# Patient Record
Sex: FEMALE | Race: WHITE
[De-identification: names, ages, dates, MRNs, and addresses within clinical notes are randomized per-mention and may not be internally consistent; named-entity substitution may affect disease eponyms.]

---

## 2022-05-02 ENCOUNTER — APPOINTMENT (OUTPATIENT)
Dept: RADIOLOGY | Facility: OTHER | Age: 22
End: 2022-05-02
Attending: FAMILY MEDICINE

## 2022-05-02 DIAGNOSIS — M79.644 PAIN OF RIGHT THUMB: ICD-10-CM

## 2022-05-02 PROCEDURE — 73140 X-RAY EXAM OF FINGER(S): CPT | Mod: TC,FY,RT | Performed by: RADIOLOGY

## 2023-10-24 ENCOUNTER — SPEECH THERAPY (OUTPATIENT)
Dept: SPEECH THERAPY | Facility: OTHER | Age: 23
End: 2023-10-24
Payer: COMMERCIAL

## 2023-10-24 DIAGNOSIS — R49.8 FUNCTIONAL VOICE DISORDER: ICD-10-CM

## 2023-10-24 PROCEDURE — 92520 LARYNGEAL FUNCTION STUDIES: CPT | Mod: GN,GC | Performed by: SPEECH-LANGUAGE PATHOLOGIST

## 2023-10-24 NOTE — OP THERAPY EVALUATION
Outpatient Speech Therapy  INITIAL EVALUATION    [facility] Speech Pathology & Audiology  [facility] [address]  Phone:  [phone]  Fax:  [phone]    Date of Evaluation: 10/24/2023    Patient: Lauren Palma  YOB: 2000  MRN: [id number]     Referring Provider: Jaren Rowley M.D.  [address] [place] [address]   Referring Diagnosis Vocal Cord Dysfunction     Time Calculation      Time In: 14:50 Time Out: 15:40  Time Calculation: 90 minutes           Chief Complaint: Vocal Cord Dysfunction    Subjective:   Reason for Therapy:     Reason For Evaluation:  Voice    Onset Date:  2023  Social Support:     Patient Mental Status:  Alert and Responsive  Progress Factors:     Progression:  Getting worse    Aggravating Factors:  Physical Activity  Pain:     Current pain ratin    At best pain ratin    At worst pain ratin  Therapy History:     Previous Diet:  Normal Diet and Normal Consistency    Current Diet:  Normal Consistency and Regular Diet    Hearing:  No Deficits    Vision:  Eye Glasses    Dentition:  Complete Dentition    Handedness:  Right-handed    No past medical history on file.  No past surgical history on file.  Speech Therapy Objective    Background Information:   Ms. Palma is a 23 year old female referred to the [facility] Speech Pathology and Audiology clinic by Jaren Rowley M.D. at the [facility]. She was referred for exercise-induced laryngeal obstruction. 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    Ms. Palma stated her chief complaint is “difficulty breathing-in' and a burning sensation while participating in high-intensity exercise.” She reports shortness of breath during these events. She states the shortness of breath is constant during the exercises but the problem escalated in severity 5-6 weeks ago. She describes  the “burning in the throat” as bothersome from her chest to her larynx. Along with having a burning sensation in her throat, she experiences a high-pitched wheezing noise that comes from her larynx every time she inhales. She denies having trouble breathing or experiencing a burning sensation in normal day-to-day activities.     Voice History & Evaluation Information Form (CPT 37214)  Form filled out by patient:    Briefly describe the problems you are having with your voice: Difficult and burning while breathing during high-intensity exercise.   What do you think caused these problems? I don't know  When did these problems begin? Probably a year ago. Started noticing 6 months ago  Was the onset sudden or gradual? Gradual  Are there times of the day when your voice is better or worse? No  How do you use your voice throughout the day? Normal. No singing, etc.  Do you talk above noise very often? No  Do you sing in a choir or for fun? No  Have you had previous treatment for voice problems? If you have, explain what it was. No  Do you ever have a burning sensation in your chest, a sour taste in your mouth or other symptoms of heartburn? If yes, how often do you feel these symptoms? A bit of burning on the chest during high-intensity exercise  Have you had a recent hearing evaluation? What were the findings? No  Do you wake up with a voice that is hoarse, rough, or low pitch? No   If yes, how often does this happen? __________________________________________        13. Do you wake up with a sour taste in your mouth? No        14. Do you wake up at night coughing? No            Do you now or have you ever had any of the following? Yes No   Bulimia  X   Arthritis  X   Stroke  X   Surgeries  X   Heart  X   Neck  X   Larynx  X   Thyroid Gland  X   Tonsils/Adenoids  X   Intubation  X   Frequent Colds  X   Shaking/tremors  X   Jaw Pain  X   Ear Pain  X   Tracheostomy  X   Tonsillitis  X       Do you now or have you ever had  "any of the following? Yes No   Allergies  X   PMS  X   Hormone Imbalance  X   Sleep apnea/snoring  X   Injury to neck  X   Neurologic disease  X   Acid indigestion  X   Trouble with lips or tongue  X   Chronic sinusitis  X   Trouble Walking  X   Dental Pain  X   Bronchitis  X   Enlarged glands  X   Ear Disease  X   Swallowing problems  X   Hypernasality  X   Paralysis/paresis  X   Enlarged glands  X   Ear Disease  X       SMOKING  Have you ever smoked?  No  How much do/did you smoke? N/A  Have you quit smoking? N/A    BEVERAGES  How much water do you drink daily? 3 Liters  How much coffee, tea, juice, and soda do you drink a day? 1 cup of coffee  How much alcohol do you drink in a week? N/A    MEDICATIONS  Do you have allergies to any medications? No  If so, what medications? Lamotrigine  What medications are you taking, the dosage, and reason for the medications? 25mg daily    Auditory Perceptual Ratings: CAPE-V conversational level  Overall Severity: functional 0/100  Roughness: functional 0/100  Breathiness: functional 0/100  Strain: functional 0/100  Pitch: functional 0/100  Loudness: functional 0/100    Assessment of Vocal Quality (Laryngeal Function (CPT 52032):  Visi-Pitch Multi Dimensional - MDVP: Acoustic Parameters  Parameters Normative Data Trial 1 Results Trial 2 Results   Fundamental Frequency Children: 265 Hz  Women: 225 Hz  Men: 128 Hz 186 Hz 211 Hz   RAP (frequency) .68-1.040% .805% .307%   Shimmer (amplitude) 3.81% 5.180% 2.032%   NHR .19 .156 .128     Visi-Pitch Real Time  Parameters Habitual Slide low Sustained low Slide High Sustained High   Mean Hz 190/194       ---           ---       ---           ---   Minimum Hz       --- 160           ---       ---           ---   Maximum Hz       ---       ---           --- 372           ---     Auditory Perceptual: Perceptibly \"patient\" vocal quality and pitch was normal with a decreased maximum phonation time (MPT) of 21.59 seconds for her age and sex.  "     Acoustic measures: Ms. Palma’s average fundamental frequency in connected speech was 186 Hz (low normal range). During two trials of phonation at modal pitch for 5.75 seconds. Patient produced a fundamental frequency of 186 Hz, with a pertubation of frequency RAP of .805% (functional), and a pertubation of amplitude of  5.180% (slightly high). The second trial produced similar numbers. When she was asked to sustained a hum, patient pitch increased to a normal range of 211 Hz (normal) and her with pertubation of frequency RAP .307% and pertubation of amplitude shimmer 2.032%.      Phonatory Aerodynamic System  Comfortable                       Maximum Phonation    Parameters     Results          Norms               Parameters       Results Norms    Mean SPL  65 dB SPL        65-80 dB SPL   Mean SPL  65 dB SPL 65-80 dB SPL    Mean Pitch  144 Hz Males: 135 dB SPL  Females: 225 dB SPL  Children: 265 dB SPL   Mean Pitch  186 Hz Males: 135 dB SPL  Females: 225 dB SPL  Children: 265 dB SPL    Phonation Time  5.75 secs    Phonation Time  21.59 secs Males: 25 seconds    Females: 21 seconds    Children: 7-9 seconds    Mean Expiratory Airflow  130 ml/sec      Abnormal      <40ml/sec   >200 ml/sec     Mean Expiratory Airflow  120 ml/sec Abnormal     <40ml/sec   >200 ml/sec    Expiratory Volume  .73 L Males: 3.25 L (SD .602)    Females: 2.14 L (SD .345)    Children: .700-1.650 L   Expiratory Volume  2.64 L Males: 3.25 L (SD .602)    Females: 2.14 L (SD .345)    Children: .700-1.650 L     Aerodynamic Assessment: revealed a mean airflow rate of 130 ml/second (functional) at a comfortable pitch and loudness with a maximum phonation time (MPT) of 5.75 seconds. A mean airflow rate of 120 ml/second (functional) at maximum phonation with MPT of 21.59 seconds. Maximum phonation expiratory volume was 2.64L.(functional)    Endoscopy with Stroboscopy (.39)    Videostroboscopy Rating   Glottal Closure: Posterior chink    Supraglottic Activity     Latero-Medial Compression: None     Merlin-Post Compression: Moderate - 3/5   Vertical Level Approximation: Equal - 1/4   Vocal Fold Edge: clear margins     Left: Smooth-Straight - 1/6     Right: Smooth-Straight - 1/6   Vocal Fold Mobility     Left: Normal - 1/4     Right: Normal - 1/4     Comments: Aperiodic vibration of the vocal folds   Amplitude of Vibration: Diminished     Left: Restricted to less than 50% - 2/7   Right: Restricted to less than 50% - 2/7     Mucosal Wave     Left: Difficult to visualize; Cannot rate      Right: Difficult to visualize; Cannot rate    Nonvibration Portion     Left: None - 0/5     Right: None - 0/5    Phase Closure: Closed phase predominates; closed 40% longer - 3/5    Phase Symmetry: 50% asymmetrical   Overall Laryngeal Function: Hyperfunctional     Speech Therapy Assessment:     Speech Mechanism Assessment:     Patient voice description: Clear    Patient's oral movements are voluntary and coordination: WFL    Patient speaks fluently: WFL    Patient exhibits articulatory precision: WFL    Nasality is within functional limits    Patient uses adequate breath support: Yes    Patient breath rate: Normal    Speech Therapy Plan :   Prognosis & Recommendations  Impression Summary:  Ms. Palma presented with a perceptibly normal vocal quality. On endoscopy with stroboscopy, anatomically she presented with erythemic striations at the base of the arytenoid cartilages bilaterally, high vascularization of the epiglottis, and unremarkable true vocal fold tissue health indicated by white opacity with straight margins. Physiologically, the patient presented with excessive supra-glottic activity involving anterior-posterior compression of the true vocal folds. During modal phonation, aperiodic vocal fold vibration was observed with phase symmetry rated 3/5-50% asymmetry, with phase closure rating of 3/5 where phase closure predominates (closed 40% longer than  open). A mild decrease in vocal fold amplitude was restricted to less than 50% of the vocal fold margin and rated a 2/7. She exhibited good vocal fold and arytenoid mobility and excursion. Phonation was generated predominantly with the anterior 1/3 of vocal folds rather than the entire margin. The client's overall laryngeal function is hyperfunctional with increased supra-glottic activity during modal tasks. 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  Prognosis:  Excellent  Patient progression on Short Term Goals:  PVFM rescue breathing techniques during high intensity exercise  Breathing exercise for conversational speech  Vocal health  Open vocal tract techniques  Vocal efficiency techniques     Long Term Goals:  Patient will improve overall vocal health and efficiency in all situations and environments.   Long Term Goal Duration (Weeks):  4-6 months  Potential barriers to Goal Achievement:  None  Therapy Recommendations  Recommendation:  Individual Speech Therapy, Ms. Palma is recommended for behavioral therapy to learn breathing and rescue techniques for managing PVFM episodes.      is recommended for behavioral voice therapy to reduce supraglottic activity and improve vocal fold phonation at the TVF margins with laryngeal efficiency exercises.    Suspect GERD- due to erythema and past history, suggest possible pharmacological intervention and monitor asymptomatic symptoms.     Planned Therapy Interventions:  Voice training, 92507 x1  Frequency:  1x week  Duration (in visits):  20  Duration (in weeks):  20      Functional Assessment Used  [id number], Phonatory Aerodynamic System (PAS)-aerodynamics, Endoscopy with videostroboscopy        Speech-Language Pathologist/Supervisor:    Kiim Lee, Ph.D.      Clinician 1:    Kirti Calvert      Clinician  2:    Lynsey Lin      Clinician 3:   Mecca Mendez     Referring provider co-signature:  I have reviewed this plan of care and my co-signature certifies the need for services.    Certification Period: 10/24/2023 to  01/24/24    Physician Signature: ________________________________ Date: ______________

## 2023-10-25 ASSESSMENT — ENCOUNTER SYMPTOMS
PAIN SCALE AT HIGHEST: 0
PAIN SCALE: 0
PAIN SCALE AT LOWEST: 0

## 2024-03-04 RX ORDER — MELOXICAM 15 MG/1
15 TABLET ORAL DAILY
Qty: 30 TABLET | Refills: 1 | Status: SHIPPED | OUTPATIENT
Start: 2024-03-04

## 2024-05-08 DIAGNOSIS — L30.9 ECZEMA, UNSPECIFIED TYPE: ICD-10-CM

## 2024-05-08 RX ORDER — PREDNISONE 50 MG/1
50 TABLET ORAL DAILY
Qty: 5 TABLET | Refills: 0 | Status: SHIPPED | OUTPATIENT
Start: 2024-05-08

## 2024-05-16 DIAGNOSIS — L30.9 ECZEMA, UNSPECIFIED TYPE: ICD-10-CM

## 2024-05-16 RX ORDER — DIAPER,BRIEF,INFANT-TODD,DISP
EACH MISCELLANEOUS
Qty: 56 G | Refills: 1 | Status: SHIPPED | OUTPATIENT
Start: 2024-05-16

## 2024-05-16 RX ORDER — FEXOFENADINE HCL 60 MG/1
60 TABLET, FILM COATED ORAL DAILY
Qty: 30 TABLET | Refills: 1 | Status: SHIPPED | OUTPATIENT
Start: 2024-05-16

## 2024-06-03 ENCOUNTER — TELEPHONE (OUTPATIENT)
Dept: MEDICAL GROUP | Facility: CLINIC | Age: 24
End: 2024-06-03
Payer: COMMERCIAL

## 2024-06-03 DIAGNOSIS — R21 RASH: ICD-10-CM

## 2024-06-03 RX ORDER — PREDNISONE 10 MG/1
TABLET ORAL
Qty: 50 TABLET | Refills: 0 | Status: SHIPPED | OUTPATIENT
Start: 2024-06-03